# Patient Record
Sex: FEMALE | Race: WHITE | NOT HISPANIC OR LATINO | URBAN - METROPOLITAN AREA
[De-identification: names, ages, dates, MRNs, and addresses within clinical notes are randomized per-mention and may not be internally consistent; named-entity substitution may affect disease eponyms.]

---

## 2023-07-07 ENCOUNTER — HOSPITAL ENCOUNTER (OUTPATIENT)
Dept: DATA CONVERSION | Facility: HOSPITAL | Age: 58
End: 2023-07-07
Attending: SURGERY | Admitting: SURGERY

## 2023-07-07 DIAGNOSIS — E78.5 HYPERLIPIDEMIA, UNSPECIFIED: ICD-10-CM

## 2023-07-07 DIAGNOSIS — K21.9 GASTRO-ESOPHAGEAL REFLUX DISEASE WITHOUT ESOPHAGITIS: ICD-10-CM

## 2023-07-07 DIAGNOSIS — J45.909 UNSPECIFIED ASTHMA, UNCOMPLICATED (HHS-HCC): ICD-10-CM

## 2023-07-07 DIAGNOSIS — Z12.11 ENCOUNTER FOR SCREENING FOR MALIGNANT NEOPLASM OF COLON: ICD-10-CM

## 2023-07-07 DIAGNOSIS — Z86.010 PERSONAL HISTORY OF COLONIC POLYPS: ICD-10-CM

## 2023-07-07 DIAGNOSIS — K57.30 DIVERTICULOSIS OF LARGE INTESTINE WITHOUT PERFORATION OR ABSCESS WITHOUT BLEEDING: ICD-10-CM

## 2023-07-07 DIAGNOSIS — K22.70 BARRETT'S ESOPHAGUS WITHOUT DYSPLASIA: ICD-10-CM

## 2023-07-07 DIAGNOSIS — K31.89 OTHER DISEASES OF STOMACH AND DUODENUM: ICD-10-CM

## 2023-07-12 LAB
COMPLETE PATHOLOGY REPORT: NORMAL
CONVERTED CLINICAL DIAGNOSIS-HISTORY: NORMAL
CONVERTED FINAL DIAGNOSIS: NORMAL
CONVERTED FINAL REPORT PDF LINK TO COPY AND PASTE: NORMAL
CONVERTED GROSS DESCRIPTION: NORMAL

## 2023-09-30 NOTE — H&P
History of Present Illness:   Pregnant/Lactating:  ·  Are You Pregnant no   ·  Are You Currently Breastfeeding no     Admission Reason: Unspecified abdominal pain.  Zapien's  esophagus without dysplasia   HPI:    Patient is a 57 year-old female referred by Dr. Diaz anticipating a Upper GI Endoscopy and Colonoscopy    Surgery Date: 7-7-23    57 year old female with Zapien's esophagus and a history of colon polyp.  Last colonoscopy was 6-18-18 with Dr. Vail, findings 4 mm rectal polyp, recommended repeat colonoscopy in 5 years.  Last EGD 11- with Dr. Diaz, findings of Zapien's  esophagus.      Patient states to be in usual state of health without any recent illnesses hospitalizations, and no current or remote infections.    Medical History  Zapien's esophagus  GERD    History of colon polyp    Surgical History  EGD  Colonoscopy      Family History:   Cancer: yes   Family History:    Family history of colon cancer.    Social History:   Social History:  Smoking Status never smoker   Alcohol Use denies   Social History                 Allergies:  ·  No Known Allergies :        Intolerances:  ·  naproxen : Lethargy    Medications Prior to Admission:   pantoprazole 40 mg.  airborne  rosuvastatin 5 mg.   albuterol inhaler.     Review of Systems:   Constitutional: NEGATIVE: Fever, Chills, Anorexia,  Weight Loss, Malaise     All Other Systems: All other systems reviewed and  are negative     Objective:   Physical Exam by System:    Constitutional: Well developed, awake/alert/oriented  x3, no distress, alert and cooperative   Eyes: PERRL, EOMI, clear sclera   ENMT: mucous membranes moist, no apparent injury,  no lesions seen   Head/Neck: Neck supple, no apparent injury, No JVD,  trachea midline   Respiratory/Thorax: unlabored   Gastrointestinal: Nondistended, soft, non-tender   Musculoskeletal: ROM intact, no joint swelling, normal  strength   Extremities: normal extremities, no cyanosis edema,   contusions or wounds, no clubbing   Neurological: alert and oriented x3, intact senses,  motor, response and reflexes, normal strength   Psychological: Appropriate mood and behavior   Skin: Warm and dry, no lesions, no rashes     Medications:    Medications:      NUTRITIONAL PRODUCTS:    1. Lactated Ringers Infusion:  1000  mL  IntraVenous  <Continuous>        Assessment and Plan:   Assessment:    57 year-old female referred by Dr. Diaz anticipating a Upper GI Endoscopy and Colonoscopy    Surgery Date: 7-7-23    PMH significant for:  Zapien's esophagus  GERD    History of colon polyp        clearances:     None needed             Electronic Signatures:  Sheri Sierra (PA (PHYSICIAN))  (Signed 07-Jul-2023 06:55)   Authored: History of Present Illness, Comorbidities,  Family History, Social History, Allergies, Medications Prior to Admission, Review of Systems, Objective, Assessment and Plan, Note Completion      Last Updated: 07-Jul-2023 06:55 by Sheri Sierra (PA (PHYSICIAN))

## 2023-09-30 NOTE — H&P
"    History & Physical Reviewed:   Pregnant/Lactating:  ·  Are You Pregnant no   ·  Are You Currently Breastfeeding no (1)     I have reviewed the History and Physical dated:  04-Jul-2023   History and Physical reviewed and relevant findings noted. Patient examined to review pertinent physical  findings.: No significant changes   Home Medications Reviewed: no changes noted   Allergies Reviewed: no changes noted       ERAS (Enhanced Recovery After Surgery):  ·  ERAS Patient: no     Consent:   COVID-19 Consent:  ·  COVID-19 Risk Consent Surgeon has reviewed key risks related to the risk of shalom COVID-19 and if they contract COVID-19 what the risks are.       Electronic Signatures:  Sheri Sierra (PA (PHYSICIAN))  (Signed 07-Jul-2023 06:41)   Authored: History & Physical Reviewed, ERAS, Consent,  Note Completion      Last Updated: 07-Jul-2023 06:41 by Sheri Sierra (PA (PHYSICIAN))    References:  1.  Data Referenced From \"Patient Profile - Preop v3\" 07-Jul-2023 06:38   "

## 2024-07-08 RX ORDER — PANTOPRAZOLE SODIUM 40 MG/1
40 TABLET, DELAYED RELEASE ORAL DAILY
Qty: 90 TABLET | Refills: 0 | OUTPATIENT
Start: 2024-07-08

## 2024-07-15 ENCOUNTER — APPOINTMENT (OUTPATIENT)
Dept: SURGERY | Facility: CLINIC | Age: 59
End: 2024-07-15
Payer: COMMERCIAL

## 2024-07-15 VITALS
TEMPERATURE: 97.7 F | OXYGEN SATURATION: 93 % | WEIGHT: 215 LBS | SYSTOLIC BLOOD PRESSURE: 125 MMHG | HEIGHT: 66 IN | BODY MASS INDEX: 34.55 KG/M2 | HEART RATE: 93 BPM | DIASTOLIC BLOOD PRESSURE: 79 MMHG

## 2024-07-15 DIAGNOSIS — K22.70 BARRETT'S ESOPHAGUS WITHOUT DYSPLASIA: Primary | ICD-10-CM

## 2024-07-15 DIAGNOSIS — K21.9 GASTROESOPHAGEAL REFLUX DISEASE WITHOUT ESOPHAGITIS: ICD-10-CM

## 2024-07-15 PROCEDURE — 99213 OFFICE O/P EST LOW 20 MIN: CPT | Performed by: SURGERY

## 2024-07-15 PROCEDURE — 1036F TOBACCO NON-USER: CPT | Performed by: SURGERY

## 2024-07-15 RX ORDER — ROSUVASTATIN CALCIUM 10 MG/1
5 TABLET, COATED ORAL DAILY
COMMUNITY
End: 2024-07-15 | Stop reason: SDUPTHER

## 2024-07-15 RX ORDER — PANTOPRAZOLE SODIUM 40 MG/1
40 TABLET, DELAYED RELEASE ORAL
COMMUNITY

## 2024-07-15 RX ORDER — ROSUVASTATIN CALCIUM 5 MG/1
5 TABLET, COATED ORAL DAILY
COMMUNITY

## 2024-07-15 RX ORDER — ALBUTEROL SULFATE 90 UG/1
2 AEROSOL, METERED RESPIRATORY (INHALATION)
COMMUNITY

## 2024-07-15 RX ORDER — PANTOPRAZOLE SODIUM 40 MG/1
40 TABLET, DELAYED RELEASE ORAL
Qty: 90 TABLET | Refills: 3 | Status: SHIPPED | OUTPATIENT
Start: 2024-07-15 | End: 2025-07-15

## 2024-07-15 ASSESSMENT — PAIN SCALES - GENERAL: PAINLEVEL: 0-NO PAIN

## 2024-07-15 NOTE — PROGRESS NOTES
Subjective   Patient ID: Sheri Peacock is a 58 y.o. female who presents for Follow-up (EST Medication Renewal Pantoprazole).  HPI  This is a pleasant patient who has a history of Zapien's esophagus.  She had an EGD and a colonoscopy 2 years ago.  The colonoscopy was unremarkable and her Zapien's was very stable.  She started on Protonix at that time and has felt great since.  She understands that she needs to stay on the Protonix because of the Zapien's esophagus and that the Zapien's should be surveyed every 3 to 5 years.  The patient has a family history of colon cancer and had a polyp in 2018 in the colon.  The patient states that she has learned certain foods and drinks to avoid such as soda red sauce and fried or greasy foods.  Review of Systems  10 point review is otherwise negative    Social history she does not smoke she drinks very little mostly because of the acid reflux  Objective   Physical Exam head is normocephalic atraumatic eyes extraocular movements are intact pupils are equal and round lungs are clear bilaterally heart is regular rate and rhythm abdomen is obese but soft and nontender    Assessment/Plan history of Zapien's esophagus no need for upper endoscopy this year however the patient should continue her Protonix and that medication prescription has been renewed           Mei Diaz MD 07/15/24 3:18 PM

## 2025-08-14 ENCOUNTER — APPOINTMENT (OUTPATIENT)
Dept: SURGERY | Facility: CLINIC | Age: 60
End: 2025-08-14
Payer: COMMERCIAL